# Patient Record
Sex: MALE | Race: WHITE | URBAN - NONMETROPOLITAN AREA
[De-identification: names, ages, dates, MRNs, and addresses within clinical notes are randomized per-mention and may not be internally consistent; named-entity substitution may affect disease eponyms.]

---

## 2019-11-04 ENCOUNTER — IMPORTED ENCOUNTER (OUTPATIENT)
Dept: URBAN - NONMETROPOLITAN AREA CLINIC 1 | Facility: CLINIC | Age: 54
End: 2019-11-04

## 2019-11-04 PROBLEM — H52.01: Noted: 2019-11-04

## 2019-11-04 PROBLEM — H52.4: Noted: 2019-11-04

## 2019-11-04 PROBLEM — H52.223: Noted: 2019-11-04

## 2019-11-04 PROBLEM — H00.11: Noted: 2018-10-31

## 2019-11-04 PROCEDURE — 92015 DETERMINE REFRACTIVE STATE: CPT

## 2019-11-04 PROCEDURE — 92014 COMPRE OPH EXAM EST PT 1/>: CPT

## 2019-11-04 NOTE — PATIENT DISCUSSION
Hyperopia-Discussed diagnosis with patient. Astigmatism-Discussed diagnosis with patient. Presbyopia-Discussed diagnosis with patient. Updated spec Rx given. Recommend lens that will provide comfort as well as protect safety and health of eyes. Resolved Chalazion RUL- No treatment currently indicated.

## 2020-06-11 NOTE — PATIENT DISCUSSION
DRY EYES : Discussed with patient the importance of keeping the eye moist and the symptoms associated with dry eyes including blurry vision, tearing, burning, and kyle sensation. Advised patient to minimize use of any fans blowing directly on the face. Advised patient to continue with artificial tears 2-3 times daily.

## 2020-06-11 NOTE — PATIENT DISCUSSION
BLEPHARITIS: NOT BOTHERSOME TO PT SINCE USING DROPS.  CAN STOP DROPS BUT IF EYES GET CRUSTY AGAIN FINISH THE REST OF THE DROPS

## 2020-09-29 NOTE — PATIENT DISCUSSION
Blepharitis Plan - gave rx of Maxitrol , advised pt if no changed to call for an appt sooner than scheduled

## 2022-04-10 ASSESSMENT — VISUAL ACUITY
OD_CC: 20/25
OS_CC: 20/25+1

## 2022-04-10 ASSESSMENT — TONOMETRY
OS_IOP_MMHG: 12
OD_IOP_MMHG: 12

## 2024-10-18 NOTE — PATIENT DISCUSSION
BLEPHARITIS:  I have explained the nature of chronic blepharitis and have instructed the patient in lid hygiene techniques. The patient is instructed to gently wash the lid margins daily with warm water and baby shampoo on a washrag followed by warm water irrigation.  Patient was given RX for Maxitrol drops 4 times a day for a week then 2 times a day for a week then stop
Blepharitis Plan - gave rx of Maxitrol 4 times a day for a week then 2 times a day for a week then stop, advised pt if no changed to call for an appt sooner than scheduled
Four times a day for 1 week, then twice a day for 1 week then stop.
General:
Medications:
New Prescription: Maxitrol (neomycin-polymyxin-dexameth): drops,suspension: 3.5-10,000-0.1 mg/mL-unit/mL-% 1 drop four times a day as directed into both eyes 05-
independent